# Patient Record
(demographics unavailable — no encounter records)

---

## 2024-11-04 NOTE — PHYSICAL EXAM
[FreeTextEntry3] : Vascular Exam: DP Pulse (left): Absent. DP Pulse (right): Absent. PT Pulse (left): Absent. PT Pulse (right): Absent. Capillary Return - L: Delayed. Capillary Return - R: Delayed. Temperature Grad - L: Warm to Cool. Temperature Grad - R: Warm to Cool. [de-identified] : Orthopedic Exam: No structural deformities present. [FreeTextEntry1] : Neurological Exam: Sharp / Dull - L: WNL. Sharp / Dull - R: WNL. Light Touch/pressure - L: WNL. Light Touch/pressure - R: WNL. Hot/cold - L: WNL. Hot/cold - R: WNL. Vibratory - L: WNL. Vibratory - R: WNL.

## 2024-11-04 NOTE — HISTORY OF PRESENT ILLNESS
[FreeTextEntry1] : Emile is a 74- year-old male who presents to our office with his wife complaining of dry, scaly skin and thick toenails.  They have gotten painful and difficult to cut.

## 2024-11-04 NOTE — PROCEDURE
[FreeTextEntry1] : Plan:  Examination.  ([id number]).  We had a lengthy discussion concerning the patient's condition.  Surgical debridement of the affected skin area.  Lotion was applied.  We demonstrated how to apply the Lachydrin he was previously prescribed.  Nail biopsy was performed. (Gp=[id number]).  Patient is requesting topical treatment. Patient to return: 1 month.

## 2024-12-09 NOTE — PROCEDURE
[FreeTextEntry1] : Plan:  Examination.  (Au=[id number]).  We had a lengthy discussion concerning the patient's condition.  Surgical debridement of the affected skin area.  Lotion was applied.  We stressed the importance of applying Lachydrin daily.  Debridement of the mycotic toenails by manual means and by electric  x 10 to patient tolerance (Fq=[id number]).  Patient to continue Penlac to the nails daily. Patient to return: 1 month.

## 2024-12-09 NOTE — REASON FOR VISIT
[Follow-Up Visit] : a follow-up visit for [Onychomycosis] : onychomycosis [FreeTextEntry2] : dry skin

## 2024-12-09 NOTE — HISTORY OF PRESENT ILLNESS
[FreeTextEntry1] : Emile is a 74- year-old male who presents to our office this morning with his wife for follow up care of dry, scaly skin and fungal toenails.  His biopsy came back positive.  He has started Penlac and sees improvement.  He has been less than diligent with his lotion.

## 2024-12-09 NOTE — PHYSICAL EXAM
[FreeTextEntry3] : Vascular Exam: DP Pulse (left): Absent. DP Pulse (right): Absent. PT Pulse (left): Absent. PT Pulse (right): Absent. Capillary Return - L: Delayed. Capillary Return - R: Delayed. Temperature Grad - L: Warm to Cool. Temperature Grad - R: Warm to Cool. [de-identified] : Orthopedic Exam: No structural deformities present. [FreeTextEntry1] : Neurological Exam: Sharp / Dull - L: WNL. Sharp / Dull - R: WNL. Light Touch/pressure - L: WNL. Light Touch/pressure - R: WNL. Hot/cold - L: WNL. Hot/cold - R: WNL. Vibratory - L: WNL. Vibratory - R: WNL.

## 2025-01-13 NOTE — HISTORY OF PRESENT ILLNESS
[FreeTextEntry1] : Emile is a 74- year-old male who presents with his wife for continued care of dry, scaly skin and fungal toenails.  He has been applying Penlac daily and sees improvement.  He has been less than diligent with his lotion.

## 2025-01-13 NOTE — PROCEDURE
[FreeTextEntry1] : Plan:  Examination.  ([id number]=[id number]).  Surgical debridement of the affected skin area.  Lotion was applied.  We stressed the importance of applying Lachydrin daily.  Debridement of the mycotic toenails by manual means and by electric  x 10 to patient tolerance.  Patient is to continue Penlac to the nails daily. Patient to return: 1 month.

## 2025-01-13 NOTE — PHYSICAL EXAM
[FreeTextEntry3] : Vascular Exam: DP Pulse (left): Absent. DP Pulse (right): Absent. PT Pulse (left): Absent. PT Pulse (right): Absent. Capillary Return - L: Delayed. Capillary Return - R: Delayed. Temperature Grad - L: Warm to Cool. Temperature Grad - R: Warm to Cool. [de-identified] : Orthopedic Exam: No structural deformities present. [FreeTextEntry1] : Neurological Exam: Sharp / Dull - L: WNL. Sharp / Dull - R: WNL. Light Touch/pressure - L: WNL. Light Touch/pressure - R: WNL. Hot/cold - L: WNL. Hot/cold - R: WNL. Vibratory - L: WNL. Vibratory - R: WNL.

## 2025-02-18 NOTE — PHYSICAL EXAM
[FreeTextEntry3] : Vascular Exam: DP Pulse (left): Absent. DP Pulse (right): Absent. PT Pulse (left): Absent. PT Pulse (right): Absent. Capillary Return - L: Delayed. Capillary Return - R: Delayed. Temperature Grad - L: Warm to Cool. Temperature Grad - R: Warm to Cool. [de-identified] : Orthopedic Exam: No structural deformities present. [FreeTextEntry1] : Neurological Exam: Sharp / Dull - L: WNL. Sharp / Dull - R: WNL. Light Touch/pressure - L: WNL. Light Touch/pressure - R: WNL. Hot/cold - L: WNL. Hot/cold - R: WNL. Vibratory - L: WNL. Vibratory - R: WNL.

## 2025-02-18 NOTE — HISTORY OF PRESENT ILLNESS
[FreeTextEntry1] : Emile is a 74- year-old male who presents with his wife this morning for continued care of dry, scaly skin and fungal toenails.  He has been applying Penlac daily with improvement.  He does not use his lotion very much.  He has trouble reaching his feet.

## 2025-02-18 NOTE — PROCEDURE
[FreeTextEntry1] : Plan:  Examination.  (Ax=[id number]).  We had a lengthy discussion concerning the patient's condition.  Surgical debridement of the affected skin area.  Lotion was applied.  We stressed the importance of applying Lachydrin daily.  Debridement of the mycotic toenails by manual means and by electric  x 10 to patient tolerance (Ed=[id number]).  Patient to continue Penlac to the nails daily. Patient to return: 1 month.

## 2025-03-21 NOTE — PHYSICAL EXAM
[General Appearance - Alert] : alert [General Appearance - In No Acute Distress] : in no acute distress [FreeTextEntry3] : Vascular Exam: DP Pulse (left): Absent. DP Pulse (right): Absent. PT Pulse (left): Absent. PT Pulse (right): Absent. Capillary Return - L: Delayed. Capillary Return - R: Delayed. Temperature Grad - L: Warm to Cool. Temperature Grad - R: Warm to Cool.  Class findings (Q8) indicated due to abs L posterior tibial pulse, abs R posterior tibial pulse, abs L dorsalis pedis pulse, abs R dorsalis pedis pulse, hair growth decreased or absent, nail changes (thickening), pigmentary changes (discoloration), skin texture L (thin, shiny), skin texture R (thin, shiny) and temperature changes.  Skin intact, no infection.       [de-identified] : Orthopedic Exam: No structural deformities present. [FreeTextEntry1] : Neurological Exam: Sharp / Dull - L: WNL. Sharp / Dull - R: WNL. Light Touch/pressure - L: WNL. Light Touch/pressure - R: WNL. Hot/cold - L: WNL. Hot/cold - R: WNL. Vibratory - L: WNL. Vibratory - R: WNL.

## 2025-03-21 NOTE — HISTORY OF PRESENT ILLNESS
[FreeTextEntry1] : Emile is a 74- year-old male who presents to our office with his wife for continued care of dry, scaly skin and fungal toenails.  He has been applying Penlac daily with improvement.  He admits to not using his lotion very much.  He has trouble reaching his feet.  He has elongated toenails that bother him. He is under the care of Waldo Bob. Date last seen: 01/13/25.

## 2025-03-21 NOTE — PROCEDURE
[FreeTextEntry1] : Plan:  Examination.  ([id number]).  We had a lengthy discussion concerning the patient's condition.  Surgical debridement of the affected skin area.  Lotion was applied.  We stressed the importance of applying Lachydrin daily.  Debridement of the mycotic toenails by manual means and by electric  to patient tolerance.  Patient to continue Penlac to the nails daily.  Left 2nd toenail, left 4th toenail, right 3rd toenail, right 4th toenail, and right 5th toenail. Elongated toenails were trimmed. (Uw=[id number]).  Patient to return: 1 month.

## 2025-04-25 NOTE — PHYSICAL EXAM
[General Appearance - Alert] : alert [General Appearance - In No Acute Distress] : in no acute distress [FreeTextEntry3] : Vascular Exam: DP Pulse (left): Absent. DP Pulse (right): Absent. PT Pulse (left): Absent. PT Pulse (right): Absent. Capillary Return - L: Delayed. Capillary Return - R: Delayed. Temperature Grad - L: Warm to Cool. Temperature Grad - R: Warm to Cool.  Class findings (Q8) indicated due to abs L posterior tibial pulse, abs R posterior tibial pulse, abs L dorsalis pedis pulse, abs R dorsalis pedis pulse, hair growth decreased or absent, nail changes (thickening), pigmentary changes (discoloration), skin texture L (thin, shiny), skin texture R (thin, shiny) and temperature changes.  Skin intact, no infection.       [de-identified] : Orthopedic Exam: No structural deformities present. [FreeTextEntry1] : Neurological Exam: Sharp / Dull - L: WNL. Sharp / Dull - R: WNL. Light Touch/pressure - L: WNL. Light Touch/pressure - R: WNL. Hot/cold - L: WNL. Hot/cold - R: WNL. Vibratory - L: WNL. Vibratory - R: WNL.

## 2025-04-25 NOTE — PROCEDURE
[FreeTextEntry1] : Plan:  Examination.  (Ad=[id number]).  We had a lengthy discussion concerning the patient's condition.  Surgical debridement of the affected skin area.  Lotion was applied.  We stressed the importance of applying Lachydrin daily.  Debridement of the mycotic toenails by manual means and by electric  to patient tolerance. (Um=[id number]).  Patient to continue Penlac to the nails daily.  Patient to return: 1 month.

## 2025-04-25 NOTE — HISTORY OF PRESENT ILLNESS
[FreeTextEntry1] : Emile is a 74- year-old male who presents today for continued care of dry, scaly skin and fungal toenails.  He has been applying Penlac daily with improvement.  He admits to not using his lotion very much.  He says it gets better by itself in the summer.

## 2025-06-06 NOTE — PHYSICAL EXAM
[General Appearance - Alert] : alert [General Appearance - In No Acute Distress] : in no acute distress [FreeTextEntry3] : Vascular Exam: DP Pulse (left): Absent. DP Pulse (right): Absent. PT Pulse (left): Absent. PT Pulse (right): Absent. Capillary Return - L: Delayed. Capillary Return - R: Delayed. Temperature Grad - L: Warm to Cool. Temperature Grad - R: Warm to Cool.  Class findings (Q8) indicated due to abs L posterior tibial pulse, abs R posterior tibial pulse, abs L dorsalis pedis pulse, abs R dorsalis pedis pulse, hair growth decreased or absent, nail changes (thickening), pigmentary changes (discoloration), skin texture L (thin, shiny), skin texture R (thin, shiny) and temperature changes.  Skin intact, no infection. [de-identified] : Orthopedic Exam: No structural deformities present. [FreeTextEntry1] : Neurological Exam: Sharp / Dull - L: WNL. Sharp / Dull - R: WNL. Light Touch/pressure - L: WNL. Light Touch/pressure - R: WNL. Hot/cold - L: WNL. Hot/cold - R: WNL. Vibratory - L: WNL. Vibratory - R: WNL.

## 2025-06-06 NOTE — HISTORY OF PRESENT ILLNESS
[FreeTextEntry1] : Emile is a 75-year-old male who presents to our office this morning for continued care of dry, scaly skin and fungal toenails.  He has been applying Penlac daily with improvement.  He admits to not using his lotion very much.  He has trouble reaching his feet.  He has elongated toenails that bother him. He is under the care of Waldo Bob. Date last seen: 03/21/25.

## 2025-06-06 NOTE — PROCEDURE
[FreeTextEntry1] : Plan:  Examination.  (Ak=[id number]).  We had a lengthy discussion concerning the patient's condition.  Surgical debridement of the affected skin area.  Lotion was applied.  We stressed the importance of applying Lachydrin daily.  Debridement of the mycotic toenails by manual means and by electric  to patient tolerance. (Wg=[id number]).  Patient to continue Penlac to the nails daily.  Left 2nd toenail, left 4th toenail, right 3rd toenail, right 4th toenail, and right 5th toenail. Elongated toenails were trimmed. (Yj=[id number]).  Patient to return: 1 month.

## 2025-07-10 NOTE — PROCEDURE
[FreeTextEntry1] : Plan:  Examination.  (Fs=[id number]).  We had a lengthy discussion concerning the patient's condition.  Surgical debridement of the affected skin area.  Lotion was applied.  We stressed the importance of applying Lachydrin daily.  Debridement of the mycotic toenails by manual means and by electric  to patient tolerance.  Patient to continue Penlac to the nails daily.  Patient to return: 1 month.

## 2025-07-10 NOTE — PHYSICAL EXAM
[General Appearance - Alert] : alert [General Appearance - In No Acute Distress] : in no acute distress [General Appearance - Well Nourished] : well nourished [General Appearance - Well-Appearing] : healthy appearing [FreeTextEntry3] : Vascular Exam: DP Pulse (left): Absent. DP Pulse (right): Absent. PT Pulse (left): Absent. PT Pulse (right): Absent. Capillary Return - L: Delayed. Capillary Return - R: Delayed. Temperature Grad - L: Warm to Cool. Temperature Grad - R: Warm to Cool.  [de-identified] : Orthopedic Exam: No structural deformities present. [FreeTextEntry1] : Neurological Exam: Sharp / Dull - L: WNL. Sharp / Dull - R: WNL. Light Touch/pressure - L: WNL. Light Touch/pressure - R: WNL. Hot/cold - L: WNL. Hot/cold - R: WNL. Vibratory - L: WNL. Vibratory - R: WNL.

## 2025-07-10 NOTE — HISTORY OF PRESENT ILLNESS
[FreeTextEntry1] : Emile is a 75- year-old male who presents this morning for continued care of dry, scaly skin and fungal toenails.  He has been applying Penlac daily with improvement.  He admits to not using his lotion very much.